# Patient Record
Sex: MALE | Race: WHITE | NOT HISPANIC OR LATINO | URBAN - METROPOLITAN AREA
[De-identification: names, ages, dates, MRNs, and addresses within clinical notes are randomized per-mention and may not be internally consistent; named-entity substitution may affect disease eponyms.]

---

## 2024-07-26 ENCOUNTER — OFFICE VISIT (OUTPATIENT)
Dept: URGENT CARE | Facility: CLINIC | Age: 49
End: 2024-07-26
Payer: COMMERCIAL

## 2024-07-26 VITALS
SYSTOLIC BLOOD PRESSURE: 143 MMHG | HEART RATE: 82 BPM | RESPIRATION RATE: 18 BRPM | DIASTOLIC BLOOD PRESSURE: 100 MMHG | OXYGEN SATURATION: 99 % | TEMPERATURE: 98 F

## 2024-07-26 DIAGNOSIS — H57.12 LEFT EYE PAIN: Primary | ICD-10-CM

## 2024-07-26 PROCEDURE — S9083 URGENT CARE CENTER GLOBAL: HCPCS | Performed by: PHYSICAL MEDICINE & REHABILITATION

## 2024-07-26 PROCEDURE — G0382 LEV 3 HOSP TYPE B ED VISIT: HCPCS | Performed by: PHYSICAL MEDICINE & REHABILITATION

## 2024-07-26 RX ORDER — PANTOPRAZOLE SODIUM 40 MG/1
TABLET, DELAYED RELEASE ORAL
COMMUNITY
Start: 2024-06-07

## 2024-07-26 RX ORDER — OFLOXACIN 3 MG/ML
1 SOLUTION/ DROPS OPHTHALMIC 4 TIMES DAILY
Qty: 5 ML | Refills: 0 | Status: SHIPPED | OUTPATIENT
Start: 2024-07-26

## 2024-07-26 RX ORDER — BEMPEDOIC ACID AND EZETIMIBE 180; 10 MG/1; MG/1
1 TABLET, FILM COATED ORAL DAILY
COMMUNITY
Start: 2024-06-04

## 2024-07-27 NOTE — PROGRESS NOTES
[facility]        NAME: Asher Washington is a 48 y.o. male  : 1975    MRN: [id number]  DATE: 2024  TIME: 8:09 PM    Assessment and Plan   Left eye pain [H57.12]  1. Left eye pain  ofloxacin (OCUFLOX) 0.3 % ophthalmic solution            Patient Instructions       Follow up with PCP in 3-5 days.  Proceed to  ER if symptoms worsen.    If tests are performed, our office will contact you with results only if changes need to made to the care plan discussed with you at the visit. [facility].    Chief Complaint     Chief Complaint   Patient presents with    Eye Pain     Patient was grinding metal last night without wearing safety glasses. Thinks something went into left eye.         History of Present Illness       Patient presenting with left eye irritation. States he was grinding metal yesterday and thinks a fragment got in his eye.     Eye Pain         Review of Systems   Review of Systems   Constitutional: Negative.    Eyes:  Positive for pain.   Respiratory: Negative.     Cardiovascular: Negative.          Current Medications       Current Outpatient Medications:     Nexlizet 180-10 MG TABS, Take 1 tablet by mouth daily, Disp: , Rfl:     ofloxacin (OCUFLOX) 0.3 % ophthalmic solution, Administer 1 drop to the right eye 4 (four) times a day, Disp: 5 mL, Rfl: 0    pantoprazole (PROTONIX) 40 mg tablet, , Disp: , Rfl:     Current Allergies     Allergies as of 2024    (No Known Allergies)            The following portions of the patient's history were reviewed and updated as appropriate: allergies, current medications, past family history, past medical history, past social history, past surgical history and problem list.     Past Medical History:   Diagnosis Date    Acid reflux        History reviewed. No pertinent surgical history.    No family history on file.      Medications have been verified.        Objective   /100   Pulse 82   Temp 98  °F (36.7 °C)   Resp 18   SpO2 99%        Physical Exam     Physical Exam  Vitals reviewed.   Eyes:      General: Lids are normal. Lids are everted, no foreign bodies appreciated. Vision grossly intact. No visual field deficit.        Right eye: No foreign body.      Extraocular Movements:      Right eye: Normal extraocular motion.      Comments: Possible corneal abrasion visualized right eye   Cardiovascular:      Rate and Rhythm: Normal rate and regular rhythm.      Pulses: Normal pulses.      Heart sounds: Normal heart sounds.   Pulmonary:      Effort: Pulmonary effort is normal. No respiratory distress.      Breath sounds: Normal breath sounds.   Neurological:      Mental Status: He is alert.